# Patient Record
Sex: FEMALE | Race: WHITE | NOT HISPANIC OR LATINO
[De-identification: names, ages, dates, MRNs, and addresses within clinical notes are randomized per-mention and may not be internally consistent; named-entity substitution may affect disease eponyms.]

---

## 2022-10-14 ENCOUNTER — APPOINTMENT (OUTPATIENT)
Dept: OTOLARYNGOLOGY | Facility: CLINIC | Age: 67
End: 2022-10-14

## 2022-10-14 VITALS
BODY MASS INDEX: 26.65 KG/M2 | HEART RATE: 90 BPM | SYSTOLIC BLOOD PRESSURE: 150 MMHG | WEIGHT: 143 LBS | DIASTOLIC BLOOD PRESSURE: 83 MMHG | HEIGHT: 61.5 IN

## 2022-10-14 DIAGNOSIS — Z86.79 PERSONAL HISTORY OF OTHER DISEASES OF THE CIRCULATORY SYSTEM: ICD-10-CM

## 2022-10-14 DIAGNOSIS — Z78.9 OTHER SPECIFIED HEALTH STATUS: ICD-10-CM

## 2022-10-14 DIAGNOSIS — Z80.9 FAMILY HISTORY OF MALIGNANT NEOPLASM, UNSPECIFIED: ICD-10-CM

## 2022-10-14 DIAGNOSIS — Z83.3 FAMILY HISTORY OF DIABETES MELLITUS: ICD-10-CM

## 2022-10-14 DIAGNOSIS — F17.200 NICOTINE DEPENDENCE, UNSPECIFIED, UNCOMPLICATED: ICD-10-CM

## 2022-10-14 DIAGNOSIS — H90.3 SENSORINEURAL HEARING LOSS, BILATERAL: ICD-10-CM

## 2022-10-14 DIAGNOSIS — H81.10 BENIGN PAROXYSMAL VERTIGO, UNSPECIFIED EAR: ICD-10-CM

## 2022-10-14 PROCEDURE — 92557 COMPREHENSIVE HEARING TEST: CPT

## 2022-10-14 PROCEDURE — 92550 TYMPANOMETRY & REFLEX THRESH: CPT

## 2022-10-14 PROCEDURE — 99203 OFFICE O/P NEW LOW 30 MIN: CPT

## 2022-10-14 RX ORDER — ASPIRIN 325 MG/1
TABLET, FILM COATED ORAL
Refills: 0 | Status: ACTIVE | COMMUNITY

## 2022-10-14 RX ORDER — CHROMIUM 200 MCG
TABLET ORAL
Refills: 0 | Status: ACTIVE | COMMUNITY

## 2022-10-14 RX ORDER — ATORVASTATIN CALCIUM 80 MG/1
TABLET, FILM COATED ORAL
Refills: 0 | Status: ACTIVE | COMMUNITY

## 2022-10-14 NOTE — ASSESSMENT
[FreeTextEntry1] : Kavita Merida presents for evaluation of vertigo. She had benign paroxysmal positional vertigo, and used the Epley maneuver to stop her vertigo episdoes. She is left with residual dysequilibrium. 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 We discussed that the dysequilibrium may take some time to resolve but we can accelerate her recovery with vestibular rehab.\par \par - [facility] vestibular rehab\par - Follow up in 1 year with audio

## 2022-10-14 NOTE — DATA REVIEWED
[de-identified] : Tymps: Type A, au\par Borderline wnl - mild snhl 250-8khz, au\par *Left asymmetry noted at 4khz

## 2022-10-14 NOTE — CONSULT LETTER
[Dear  ___] : Dear  [unfilled], [Consult Letter:] : I had the pleasure of evaluating your patient, [unfilled]. [Please see my note below.] : Please see my note below. [Consult Closing:] : Thank you very much for allowing me to participate in the care of this patient.  If you have any questions, please do not hesitate to contact me. [Sincerely,] : Sincerely, [FreeTextEntry3] : Levar Hodges M.D.

## 2022-10-14 NOTE — REVIEW OF SYSTEMS
[Ear Pain] : ear pain [Ear Itch] : ear itch [Dizziness] : dizziness [Vertigo] : vertigo [Lightheadedness] : lightheadedness [Ear Drainage] : ear drainage [Ear Noises] : ear noises [Nasal Congestion] : nasal congestion [Sinus Pain] : sinus pain [Sinus Pressure] : sinus pressure [Easy Bruising] : a tendency for easy bruising [Negative] : Endocrine [FreeTextEntry1] : headaches, sweating at night, fatigue

## 2022-10-14 NOTE — HISTORY OF PRESENT ILLNESS
[de-identified] : Kavita Merida is a 66 yo female with past medical history significant for HTN who was referred by Dr. Steven for evaluation of vertigo. She states that about ten days ago, when she got out of bed, she felt that the room was spinning. She notes that this primarily occurs when turning to the left. This lasted for seconds to minutes. She had residual dysequilibrium. She was told she had BPPV and tried using the Epley maneuver. She notes that the vertigo has resolved but she has the residual dysequilibrium. She denies hearing change or tinnitus. She denies otalgia, otorrhea. She denies fevers, chills, facial weakness, or facial numbness. She denies weakness or numbness of her extremities.\par \par She notes that she had vertigo years ago as well. This episode lasted longer and she was seen by a neurologist for this. Imaging was not performed at that time.